# Patient Record
Sex: MALE | Race: ASIAN | NOT HISPANIC OR LATINO | URBAN - METROPOLITAN AREA
[De-identification: names, ages, dates, MRNs, and addresses within clinical notes are randomized per-mention and may not be internally consistent; named-entity substitution may affect disease eponyms.]

---

## 2023-08-07 ENCOUNTER — OFFICE VISIT (OUTPATIENT)
Dept: FAMILY MEDICINE | Facility: CLINIC | Age: 63
End: 2023-08-07
Payer: COMMERCIAL

## 2023-08-07 VITALS
HEART RATE: 78 BPM | HEIGHT: 66 IN | WEIGHT: 172.38 LBS | TEMPERATURE: 97 F | OXYGEN SATURATION: 98 % | DIASTOLIC BLOOD PRESSURE: 80 MMHG | SYSTOLIC BLOOD PRESSURE: 126 MMHG | RESPIRATION RATE: 18 BRPM | BODY MASS INDEX: 27.7 KG/M2

## 2023-08-07 DIAGNOSIS — Z00.00 ANNUAL PHYSICAL EXAM: Primary | ICD-10-CM

## 2023-08-07 DIAGNOSIS — E78.2 MIXED HYPERLIPIDEMIA: ICD-10-CM

## 2023-08-07 PROCEDURE — 99204 PR OFFICE/OUTPT VISIT, NEW, LEVL IV, 45-59 MIN: ICD-10-PCS | Mod: S$GLB,,, | Performed by: INTERNAL MEDICINE

## 2023-08-07 PROCEDURE — 1159F PR MEDICATION LIST DOCUMENTED IN MEDICAL RECORD: ICD-10-PCS | Mod: CPTII,S$GLB,, | Performed by: INTERNAL MEDICINE

## 2023-08-07 PROCEDURE — 3008F PR BODY MASS INDEX (BMI) DOCUMENTED: ICD-10-PCS | Mod: CPTII,S$GLB,, | Performed by: INTERNAL MEDICINE

## 2023-08-07 PROCEDURE — 3074F SYST BP LT 130 MM HG: CPT | Mod: CPTII,S$GLB,, | Performed by: INTERNAL MEDICINE

## 2023-08-07 PROCEDURE — 3079F PR MOST RECENT DIASTOLIC BLOOD PRESSURE 80-89 MM HG: ICD-10-PCS | Mod: CPTII,S$GLB,, | Performed by: INTERNAL MEDICINE

## 2023-08-07 PROCEDURE — 99999 PR PBB SHADOW E&M-NEW PATIENT-LVL IV: ICD-10-PCS | Mod: PBBFAC,,, | Performed by: INTERNAL MEDICINE

## 2023-08-07 PROCEDURE — 3079F DIAST BP 80-89 MM HG: CPT | Mod: CPTII,S$GLB,, | Performed by: INTERNAL MEDICINE

## 2023-08-07 PROCEDURE — 99204 OFFICE O/P NEW MOD 45 MIN: CPT | Mod: S$GLB,,, | Performed by: INTERNAL MEDICINE

## 2023-08-07 PROCEDURE — 99999 PR PBB SHADOW E&M-NEW PATIENT-LVL IV: CPT | Mod: PBBFAC,,, | Performed by: INTERNAL MEDICINE

## 2023-08-07 PROCEDURE — 3074F PR MOST RECENT SYSTOLIC BLOOD PRESSURE < 130 MM HG: ICD-10-PCS | Mod: CPTII,S$GLB,, | Performed by: INTERNAL MEDICINE

## 2023-08-07 PROCEDURE — 3008F BODY MASS INDEX DOCD: CPT | Mod: CPTII,S$GLB,, | Performed by: INTERNAL MEDICINE

## 2023-08-07 PROCEDURE — 1160F PR REVIEW ALL MEDS BY PRESCRIBER/CLIN PHARMACIST DOCUMENTED: ICD-10-PCS | Mod: CPTII,S$GLB,, | Performed by: INTERNAL MEDICINE

## 2023-08-07 PROCEDURE — 1160F RVW MEDS BY RX/DR IN RCRD: CPT | Mod: CPTII,S$GLB,, | Performed by: INTERNAL MEDICINE

## 2023-08-07 PROCEDURE — 1159F MED LIST DOCD IN RCRD: CPT | Mod: CPTII,S$GLB,, | Performed by: INTERNAL MEDICINE

## 2023-08-07 RX ORDER — MULTIVITAMIN
1 TABLET ORAL DAILY
COMMUNITY

## 2023-08-07 RX ORDER — PRAVASTATIN SODIUM 80 MG/1
80 TABLET ORAL DAILY
COMMUNITY
End: 2023-08-07 | Stop reason: SDUPTHER

## 2023-08-07 RX ORDER — PRAVASTATIN SODIUM 80 MG/1
80 TABLET ORAL DAILY
Qty: 90 TABLET | Refills: 3 | Status: SHIPPED | OUTPATIENT
Start: 2023-08-07 | End: 2023-09-15 | Stop reason: SDUPTHER

## 2023-08-07 RX ORDER — FEXOFENADINE HCL AND PSEUDOEPHEDRINE HCI 180; 240 MG/1; MG/1
1 TABLET, EXTENDED RELEASE ORAL DAILY
COMMUNITY

## 2023-08-07 RX ORDER — AMOXICILLIN 500 MG
1 CAPSULE ORAL
COMMUNITY

## 2023-08-07 RX ORDER — ASPIRIN 81 MG/1
81 TABLET ORAL
COMMUNITY

## 2023-08-07 NOTE — PROGRESS NOTES
Subjective     Patient ID: David Epstein is a 63 y.o. male.    Chief Complaint: Establish Care      HPI  Patient presents for physical exam. He has no complaints today. He had a colonoscopy in November of 2021 and gets them every 5 years.     Review of Systems   Constitutional:  Negative for chills and fatigue.   Respiratory:  Negative for chest tightness and shortness of breath.    Cardiovascular:  Negative for chest pain and palpitations.   Gastrointestinal:  Negative for abdominal pain, constipation, diarrhea, nausea and vomiting.   Genitourinary:  Negative for frequency and urgency.   Neurological:  Negative for dizziness, numbness and headaches.          Objective     Physical Exam  Constitutional:       General: He is not in acute distress.     Appearance: Normal appearance. He is normal weight.   HENT:      Head: Normocephalic and atraumatic.   Cardiovascular:      Rate and Rhythm: Normal rate and regular rhythm.      Heart sounds: Normal heart sounds. No murmur heard.     No friction rub. No gallop.   Pulmonary:      Effort: Pulmonary effort is normal.      Breath sounds: Normal breath sounds. No wheezing, rhonchi or rales.   Abdominal:      General: Bowel sounds are normal. There is no distension.      Palpations: Abdomen is soft.      Tenderness: There is no abdominal tenderness. There is no rebound.   Skin:     General: Skin is warm and dry.      Coloration: Skin is not jaundiced.   Neurological:      General: No focal deficit present.      Mental Status: He is alert and oriented to person, place, and time. Mental status is at baseline.   Psychiatric:         Mood and Affect: Mood normal.         Behavior: Behavior normal.            Assessment and Plan     1. Annual physical exam  Comments:  Will order Lipid panel, CBC and CMP for monitoring.  Orders:  -     CBC Auto Differential; Future; Expected date: 08/07/2023  -     Comprehensive Metabolic Panel; Future; Expected date: 08/07/2023    2. Mixed  hyperlipidemia  Comments:  Will get lipid panel for medication monitoring. Will refill Pravastatin today  Orders:  -     Lipid Panel; Future; Expected date: 08/07/2023  -     pravastatin (PRAVACHOL) 80 MG tablet; Take 1 tablet (80 mg total) by mouth once daily.  Dispense: 90 tablet; Refill: 3               Follow up in about 6 months (around 2/7/2024).

## 2023-08-10 ENCOUNTER — LAB VISIT (OUTPATIENT)
Dept: LAB | Facility: HOSPITAL | Age: 63
End: 2023-08-10
Attending: INTERNAL MEDICINE
Payer: COMMERCIAL

## 2023-08-10 DIAGNOSIS — E78.2 MIXED HYPERLIPIDEMIA: ICD-10-CM

## 2023-08-10 DIAGNOSIS — Z00.00 ANNUAL PHYSICAL EXAM: ICD-10-CM

## 2023-08-10 LAB
ALBUMIN SERPL BCP-MCNC: 3.7 G/DL (ref 3.5–5.2)
ALP SERPL-CCNC: 58 U/L (ref 55–135)
ALT SERPL W/O P-5'-P-CCNC: 24 U/L (ref 10–44)
ANION GAP SERPL CALC-SCNC: 8 MMOL/L (ref 8–16)
AST SERPL-CCNC: 24 U/L (ref 10–40)
BASOPHILS # BLD AUTO: 0.03 K/UL (ref 0–0.2)
BASOPHILS NFR BLD: 0.6 % (ref 0–1.9)
BILIRUB SERPL-MCNC: 0.9 MG/DL (ref 0.1–1)
BUN SERPL-MCNC: 14 MG/DL (ref 8–23)
CALCIUM SERPL-MCNC: 9.1 MG/DL (ref 8.7–10.5)
CHLORIDE SERPL-SCNC: 108 MMOL/L (ref 95–110)
CHOLEST SERPL-MCNC: 163 MG/DL (ref 120–199)
CHOLEST/HDLC SERPL: 3.7 {RATIO} (ref 2–5)
CO2 SERPL-SCNC: 26 MMOL/L (ref 23–29)
CREAT SERPL-MCNC: 0.8 MG/DL (ref 0.5–1.4)
DIFFERENTIAL METHOD: ABNORMAL
EOSINOPHIL # BLD AUTO: 0.1 K/UL (ref 0–0.5)
EOSINOPHIL NFR BLD: 2.9 % (ref 0–8)
ERYTHROCYTE [DISTWIDTH] IN BLOOD BY AUTOMATED COUNT: 13.2 % (ref 11.5–14.5)
EST. GFR  (NO RACE VARIABLE): >60 ML/MIN/1.73 M^2
GLUCOSE SERPL-MCNC: 86 MG/DL (ref 70–110)
HCT VFR BLD AUTO: 48.3 % (ref 40–54)
HDLC SERPL-MCNC: 44 MG/DL (ref 40–75)
HDLC SERPL: 27 % (ref 20–50)
HGB BLD-MCNC: 15.3 G/DL (ref 14–18)
IMM GRANULOCYTES # BLD AUTO: 0.01 K/UL (ref 0–0.04)
IMM GRANULOCYTES NFR BLD AUTO: 0.2 % (ref 0–0.5)
LDLC SERPL CALC-MCNC: 88.2 MG/DL (ref 63–159)
LYMPHOCYTES # BLD AUTO: 1.9 K/UL (ref 1–4.8)
LYMPHOCYTES NFR BLD: 39.6 % (ref 18–48)
MCH RBC QN AUTO: 28.8 PG (ref 27–31)
MCHC RBC AUTO-ENTMCNC: 31.7 G/DL (ref 32–36)
MCV RBC AUTO: 91 FL (ref 82–98)
MONOCYTES # BLD AUTO: 0.5 K/UL (ref 0.3–1)
MONOCYTES NFR BLD: 9.3 % (ref 4–15)
NEUTROPHILS # BLD AUTO: 2.3 K/UL (ref 1.8–7.7)
NEUTROPHILS NFR BLD: 47.4 % (ref 38–73)
NONHDLC SERPL-MCNC: 119 MG/DL
NRBC BLD-RTO: 0 /100 WBC
PLATELET # BLD AUTO: 206 K/UL (ref 150–450)
PMV BLD AUTO: 10.3 FL (ref 9.2–12.9)
POTASSIUM SERPL-SCNC: 4.1 MMOL/L (ref 3.5–5.1)
PROT SERPL-MCNC: 7 G/DL (ref 6–8.4)
RBC # BLD AUTO: 5.31 M/UL (ref 4.6–6.2)
SODIUM SERPL-SCNC: 142 MMOL/L (ref 136–145)
TRIGL SERPL-MCNC: 154 MG/DL (ref 30–150)
WBC # BLD AUTO: 4.85 K/UL (ref 3.9–12.7)

## 2023-08-10 PROCEDURE — 85025 COMPLETE CBC W/AUTO DIFF WBC: CPT | Performed by: INTERNAL MEDICINE

## 2023-08-10 PROCEDURE — 36415 COLL VENOUS BLD VENIPUNCTURE: CPT | Mod: PO | Performed by: INTERNAL MEDICINE

## 2023-08-10 PROCEDURE — 80061 LIPID PANEL: CPT | Performed by: INTERNAL MEDICINE

## 2023-08-10 PROCEDURE — 80053 COMPREHEN METABOLIC PANEL: CPT | Performed by: INTERNAL MEDICINE

## 2023-09-15 DIAGNOSIS — E78.2 MIXED HYPERLIPIDEMIA: ICD-10-CM

## 2023-09-15 RX ORDER — PRAVASTATIN SODIUM 80 MG/1
80 TABLET ORAL DAILY
Qty: 90 TABLET | Refills: 3 | Status: SHIPPED | OUTPATIENT
Start: 2023-09-15 | End: 2024-09-14

## 2023-09-15 NOTE — TELEPHONE ENCOUNTER
----- Message from Yanna volodymyr sent at 9/15/2023  9:41 AM CDT -----  Name of Who is Calling:Patient           What is the request in detail:Patient requesting refill at a new pharmacy.    pravastatin (PRAVACHOL) 80 MG tablet 90 tablet 3 8/7/2023 8/6/2024 No  Sig - Route: Take 1 tablet (80 mg total) by mouth once daily. - Oral  Sent to pharmacy as: pravastatin (PRAVACHOL) 80 MG tablet  Class: Normal  Order: [id number]  Date/Time Signed: 8/7/2023 14:48      E-Prescribing Status: Receipt confirmed by pharmacy (8/7/2023  2:48 PM CDT)        [facility] [address]               Can the clinic reply by [facility]:no           What Number to Call Back if not in [facility]: [phone]

## 2024-02-06 DIAGNOSIS — Z12.11 COLON CANCER SCREENING: ICD-10-CM

## 2025-02-24 ENCOUNTER — HOSPITAL ENCOUNTER (EMERGENCY)
Facility: HOSPITAL | Age: 65
Discharge: ANOTHER HOSPITAL | End: 2025-02-24
Attending: EMERGENCY MEDICINE
Payer: COMMERCIAL

## 2025-02-24 VITALS
OXYGEN SATURATION: 99 % | WEIGHT: 180 LBS | TEMPERATURE: 98 F | HEIGHT: 68 IN | HEART RATE: 76 BPM | SYSTOLIC BLOOD PRESSURE: 137 MMHG | DIASTOLIC BLOOD PRESSURE: 81 MMHG | BODY MASS INDEX: 27.28 KG/M2 | RESPIRATION RATE: 18 BRPM

## 2025-02-24 DIAGNOSIS — L02.91 PHLEGMON: Primary | ICD-10-CM

## 2025-02-24 LAB
ALBUMIN SERPL BCP-MCNC: 3.6 G/DL (ref 3.5–5.2)
ALP SERPL-CCNC: 66 U/L (ref 40–150)
ALT SERPL W/O P-5'-P-CCNC: 30 U/L (ref 10–44)
ANION GAP SERPL CALC-SCNC: 8 MMOL/L (ref 8–16)
AST SERPL-CCNC: 27 U/L (ref 10–40)
BASOPHILS # BLD AUTO: 0.05 K/UL (ref 0–0.2)
BASOPHILS NFR BLD: 0.8 % (ref 0–1.9)
BILIRUB SERPL-MCNC: 0.4 MG/DL (ref 0.1–1)
BUN SERPL-MCNC: 18 MG/DL (ref 8–23)
CALCIUM SERPL-MCNC: 9.1 MG/DL (ref 8.7–10.5)
CHLORIDE SERPL-SCNC: 108 MMOL/L (ref 95–110)
CO2 SERPL-SCNC: 22 MMOL/L (ref 23–29)
CREAT SERPL-MCNC: 0.8 MG/DL (ref 0.5–1.4)
DIFFERENTIAL METHOD BLD: NORMAL
EOSINOPHIL # BLD AUTO: 0.4 K/UL (ref 0–0.5)
EOSINOPHIL NFR BLD: 6.8 % (ref 0–8)
ERYTHROCYTE [DISTWIDTH] IN BLOOD BY AUTOMATED COUNT: 12.5 % (ref 11.5–14.5)
EST. GFR  (NO RACE VARIABLE): >60 ML/MIN/1.73 M^2
GLUCOSE SERPL-MCNC: 106 MG/DL (ref 70–110)
HCT VFR BLD AUTO: 45.1 % (ref 40–54)
HCV AB SERPL QL IA: NEGATIVE
HEP C VIRUS HOLD SPECIMEN: NORMAL
HGB BLD-MCNC: 14.6 G/DL (ref 14–18)
HIV 1+2 AB+HIV1 P24 AG SERPL QL IA: NEGATIVE
IMM GRANULOCYTES # BLD AUTO: 0.01 K/UL (ref 0–0.04)
IMM GRANULOCYTES NFR BLD AUTO: 0.2 % (ref 0–0.5)
LACTATE SERPL-SCNC: 1.3 MMOL/L (ref 0.5–2.2)
LYMPHOCYTES # BLD AUTO: 2 K/UL (ref 1–4.8)
LYMPHOCYTES NFR BLD: 30.6 % (ref 18–48)
MCH RBC QN AUTO: 29.4 PG (ref 27–31)
MCHC RBC AUTO-ENTMCNC: 32.4 G/DL (ref 32–36)
MCV RBC AUTO: 91 FL (ref 82–98)
MONOCYTES # BLD AUTO: 0.6 K/UL (ref 0.3–1)
MONOCYTES NFR BLD: 8.9 % (ref 4–15)
NEUTROPHILS # BLD AUTO: 3.4 K/UL (ref 1.8–7.7)
NEUTROPHILS NFR BLD: 52.7 % (ref 38–73)
NRBC BLD-RTO: 0 /100 WBC
PLATELET # BLD AUTO: 202 K/UL (ref 150–450)
PMV BLD AUTO: 9.7 FL (ref 9.2–12.9)
POTASSIUM SERPL-SCNC: 4.2 MMOL/L (ref 3.5–5.1)
PROCALCITONIN SERPL IA-MCNC: 0.02 NG/ML
PROT SERPL-MCNC: 7.2 G/DL (ref 6–8.4)
RBC # BLD AUTO: 4.96 M/UL (ref 4.6–6.2)
SODIUM SERPL-SCNC: 138 MMOL/L (ref 136–145)
WBC # BLD AUTO: 6.5 K/UL (ref 3.9–12.7)

## 2025-02-24 PROCEDURE — 87389 HIV-1 AG W/HIV-1&-2 AB AG IA: CPT | Performed by: EMERGENCY MEDICINE

## 2025-02-24 PROCEDURE — 80053 COMPREHEN METABOLIC PANEL: CPT | Performed by: REGISTERED NURSE

## 2025-02-24 PROCEDURE — 25500020 PHARM REV CODE 255: Performed by: EMERGENCY MEDICINE

## 2025-02-24 PROCEDURE — 85025 COMPLETE CBC W/AUTO DIFF WBC: CPT | Performed by: REGISTERED NURSE

## 2025-02-24 PROCEDURE — 96365 THER/PROPH/DIAG IV INF INIT: CPT

## 2025-02-24 PROCEDURE — 86803 HEPATITIS C AB TEST: CPT | Performed by: EMERGENCY MEDICINE

## 2025-02-24 PROCEDURE — 63600175 PHARM REV CODE 636 W HCPCS: Performed by: REGISTERED NURSE

## 2025-02-24 PROCEDURE — 87040 BLOOD CULTURE FOR BACTERIA: CPT | Performed by: REGISTERED NURSE

## 2025-02-24 PROCEDURE — 99285 EMERGENCY DEPT VISIT HI MDM: CPT | Mod: 25

## 2025-02-24 PROCEDURE — 83605 ASSAY OF LACTIC ACID: CPT | Performed by: REGISTERED NURSE

## 2025-02-24 PROCEDURE — 84145 PROCALCITONIN (PCT): CPT | Performed by: REGISTERED NURSE

## 2025-02-24 RX ORDER — CLINDAMYCIN PHOSPHATE 900 MG/50ML
900 INJECTION, SOLUTION INTRAVENOUS
Status: COMPLETED | OUTPATIENT
Start: 2025-02-24 | End: 2025-02-24

## 2025-02-24 RX ORDER — ROSUVASTATIN CALCIUM 40 MG/1
40 TABLET, COATED ORAL NIGHTLY
COMMUNITY

## 2025-02-24 RX ADMIN — IOHEXOL 100 ML: 350 INJECTION, SOLUTION INTRAVENOUS at 04:02

## 2025-02-24 RX ADMIN — CLINDAMYCIN PHOSPHATE 900 MG: 900 INJECTION, SOLUTION INTRAVENOUS at 03:02

## 2025-02-24 NOTE — FIRST PROVIDER EVALUATION
"Medical screening examination initiated.  I have conducted a focused provider triage encounter, findings are as follows:    Brief history of present illness:  Eli-oribitial cellulitis, took 1 week of abx and worsening. Sent by eye doctor for further eval.    Vitals:    02/24/25 1337   BP: (!) 138/90   BP Location: Right arm   Pulse: 77   Resp: 16   Temp: 98.1 °F (36.7 °C)   TempSrc: Oral   SpO2: 98%   Weight: 81.6 kg (180 lb)   Height: 5' 8" (1.727 m)       Pertinent physical exam:  No acute distress, VS stable    Brief workup plan:  Workup    Preliminary workup initiated; this workup will be continued and followed by the physician or advanced practice provider that is assigned to the patient when roomed.  "

## 2025-02-24 NOTE — ED PROVIDER NOTES
SCRIBE #1 NOTE: I, Justin Watson, am scribing for, and in the presence of, Jeanne Munoz DO. I have scribed the HPI, ROS, and PE.    SCRIBE #2 NOTE: I, Rossi Marie, am scribing for, and in the presence of,  Mat Singh MD. I have scribed the remaining portions of the note not scribed by Scribe #1.      History     Chief Complaint   Patient presents with    Eye Problem     Pt states he has an eye infection and was given antibiotics. Pt states he took all the antibiotics and his eye is not any better. Pt was advised by his PCP to come to the ED      Review of patient's allergies indicates:   Allergen Reactions    Grass pollen-june grass standard          History of Present Illness     HPI    2/24/2025, 3:11 PM  History obtained from the patient      History of Present Illness: David Epstein is a 64 y.o. male patient who presents to the Emergency Department for evaluation of left eye pain which onset gradually within the past two weeks. Pt saw his eye doctor last week who had originally prescribed 7 days of amoxicillin oral and antibiotic eye ointment medication, which provided no relief or improvement. Pt saw eye doctor today and was sent to the ED for further evaluation. Pt has facial swelling over the left orbit. Symptoms are constant and moderate in severity. No mitigating or exacerbating factors reported. No associated sxs. Patient denies any vision changes and all other sxs at this time. No further complaints or concerns at this time.       Arrival mode: Personal vehicle    PCP: Rebeca Christiansen DO        Past Medical History:  History reviewed. No pertinent past medical history.    Past Surgical History:  History reviewed. No pertinent surgical history.      Family History:  No family history on file.    Social History:  Social History     Tobacco Use    Smoking status: Never    Smokeless tobacco: Never   Substance and Sexual Activity    Alcohol use: Not on file    Drug use: Not on file    Sexual  "activity: Not on file        Review of Systems     Review of Systems   Constitutional:  Negative for fever.   HENT:  Positive for facial swelling (to left oribital).    Eyes:  Positive for pain (left eye) and redness. Negative for photophobia and visual disturbance.   Neurological:  Negative for headaches.   Psychiatric/Behavioral:  Negative for confusion.         Physical Exam     Initial Vitals [02/24/25 1337]   BP Pulse Resp Temp SpO2   (!) 138/90 77 16 98.1 °F (36.7 °C) 98 %      MAP       --          Physical Exam  Nursing Notes and Vital Signs Reviewed.  Constitutional: Patient is in no acute distress. Well-developed and well-nourished.  Head: Atraumatic. Normocephalic.  Eyes: PERRL. EOM intact; pain with EOM movement. Scleral injection on the left. There is periorbital edema over the left eye. Proptosis.  ENT: Mucous membranes are moist. Oropharynx is clear and symmetric.    Neck: Supple. Full ROM. No lymphadenopathy.  Cardiovascular: Regular rate. Regular rhythm. No murmurs, rubs, or gallops.   Pulmonary/Chest: No respiratory distress. Clear to auscultation bilaterally. No wheezing or rales.  Abdominal: Soft and non-distended.  There is no tenderness.  No rebound, guarding, or rigidity.  Musculoskeletal: Moves all extremities. No obvious deformities.   Skin: Warm and dry.  Neurological:  Alert, awake, and appropriate.  Normal speech.  No acute focal neurological deficits are appreciated.  Psychiatric: Normal affect. Appropriate in content.     ED Course   Procedures  ED Vital Signs:  Vitals:    02/24/25 1337 02/24/25 1530 02/24/25 1715 02/24/25 1905   BP: (!) 138/90 117/74 129/78 128/88   Pulse: 77 69 74 77   Resp: 16 18 16 18   Temp: 98.1 °F (36.7 °C)      TempSrc: Oral      SpO2: 98% 99% 99% 99%   Weight: 81.6 kg (180 lb)      Height: 5' 8" (1.727 m)       02/24/25 2020 02/24/25 2100 02/24/25 2250   BP: (!) 146/80 (!) 140/77 137/81   Pulse: 79 74 76   Resp: 18 18 18   Temp: 97.9 °F (36.6 °C)     TempSrc: " Oral     SpO2: 98% 99% 99%   Weight:      Height:          Abnormal Lab Results:  Labs Reviewed   COMPREHENSIVE METABOLIC PANEL - Abnormal       Result Value    Sodium 138      Potassium 4.2      Chloride 108      CO2 22 (*)     Glucose 106      BUN 18      Creatinine 0.8      Calcium 9.1      Total Protein 7.2      Albumin 3.6      Total Bilirubin 0.4      Alkaline Phosphatase 66      AST 27      ALT 30      eGFR >60      Anion Gap 8     CULTURE, BLOOD    Blood Culture, Routine No Growth to date     CULTURE, BLOOD    Blood Culture, Routine No Growth to date     CBC W/ AUTO DIFFERENTIAL    WBC 6.50      RBC 4.96      Hemoglobin 14.6      Hematocrit 45.1      MCV 91      MCH 29.4      MCHC 32.4      RDW 12.5      Platelets 202      MPV 9.7      Immature Granulocytes 0.2      Gran # (ANC) 3.4      Immature Grans (Abs) 0.01      Lymph # 2.0      Mono # 0.6      Eos # 0.4      Baso # 0.05      nRBC 0      Gran % 52.7      Lymph % 30.6      Mono % 8.9      Eosinophil % 6.8      Basophil % 0.8      Differential Method Automated     LACTIC ACID, PLASMA    Lactate (Lactic Acid) 1.3     PROCALCITONIN    Procalcitonin 0.02     HEPATITIS C ANTIBODY    Hepatitis C Ab Negative      Narrative:     Release to patient->Immediate   HEP C VIRUS HOLD SPECIMEN    HEP C Virus Hold Specimen Hold for HCV sendout      Narrative:     Release to patient->Immediate   HIV 1 / 2 ANTIBODY    HIV 1/2 Ag/Ab Negative      Narrative:     Release to patient->Immediate        All Lab Results:  Results for orders placed or performed during the hospital encounter of 02/24/25   Blood Culture #2 **CANNOT BE ORDERED STAT**    Collection Time: 02/24/25  2:26 PM    Specimen: Peripheral, Forearm, Left; Blood   Result Value Ref Range    Blood Culture, Routine No Growth to date    Blood Culture #1 **CANNOT BE ORDERED STAT**    Collection Time: 02/24/25  2:26 PM    Specimen: Peripheral, Forearm, Left; Blood   Result Value Ref Range    Blood Culture, Routine No  Growth to date    CBC auto differential    Collection Time: 02/24/25  2:28 PM   Result Value Ref Range    WBC 6.50 3.90 - 12.70 K/uL    RBC 4.96 4.60 - 6.20 M/uL    Hemoglobin 14.6 14.0 - 18.0 g/dL    Hematocrit 45.1 40.0 - 54.0 %    MCV 91 82 - 98 fL    MCH 29.4 27.0 - 31.0 pg    MCHC 32.4 32.0 - 36.0 g/dL    RDW 12.5 11.5 - 14.5 %    Platelets 202 150 - 450 K/uL    MPV 9.7 9.2 - 12.9 fL    Immature Granulocytes 0.2 0.0 - 0.5 %    Gran # (ANC) 3.4 1.8 - 7.7 K/uL    Immature Grans (Abs) 0.01 0.00 - 0.04 K/uL    Lymph # 2.0 1.0 - 4.8 K/uL    Mono # 0.6 0.3 - 1.0 K/uL    Eos # 0.4 0.0 - 0.5 K/uL    Baso # 0.05 0.00 - 0.20 K/uL    nRBC 0 0 /100 WBC    Gran % 52.7 38.0 - 73.0 %    Lymph % 30.6 18.0 - 48.0 %    Mono % 8.9 4.0 - 15.0 %    Eosinophil % 6.8 0.0 - 8.0 %    Basophil % 0.8 0.0 - 1.9 %    Differential Method Automated    Comprehensive metabolic panel    Collection Time: 02/24/25  2:28 PM   Result Value Ref Range    Sodium 138 136 - 145 mmol/L    Potassium 4.2 3.5 - 5.1 mmol/L    Chloride 108 95 - 110 mmol/L    CO2 22 (L) 23 - 29 mmol/L    Glucose 106 70 - 110 mg/dL    BUN 18 8 - 23 mg/dL    Creatinine 0.8 0.5 - 1.4 mg/dL    Calcium 9.1 8.7 - 10.5 mg/dL    Total Protein 7.2 6.0 - 8.4 g/dL    Albumin 3.6 3.5 - 5.2 g/dL    Total Bilirubin 0.4 0.1 - 1.0 mg/dL    Alkaline Phosphatase 66 40 - 150 U/L    AST 27 10 - 40 U/L    ALT 30 10 - 44 U/L    eGFR >60 >60 mL/min/1.73 m^2    Anion Gap 8 8 - 16 mmol/L   Lactic acid, plasma    Collection Time: 02/24/25  2:28 PM   Result Value Ref Range    Lactate (Lactic Acid) 1.3 0.5 - 2.2 mmol/L   Procalcitonin    Collection Time: 02/24/25  2:28 PM   Result Value Ref Range    Procalcitonin 0.02 <0.25 ng/mL   Hepatitis C Antibody    Collection Time: 02/24/25  2:28 PM   Result Value Ref Range    Hepatitis C Ab Negative Negative   HCV Virus Hold Specimen    Collection Time: 02/24/25  2:28 PM   Result Value Ref Range    HEP C Virus Hold Specimen Hold for HCV sendout    HIV 1/2 Ag/Ab  (4th Gen)    Collection Time: 02/24/25  2:28 PM   Result Value Ref Range    HIV 1/2 Ag/Ab Negative Negative         Imaging Results:  Imaging Results              CT Head Without Contrast (Final result)  Result time 02/24/25 16:43:25      Final result by Devin Gray ([facility]), MD (02/24/25 16:43:25)                   Impression:     No acute intracranial abnormalities.    All CT scans at [this location] are performed using dose modulation techniques as appropriate to a performed exam including the following:  Automated exposure control; adjustment of the mA and/or kV according to patient size (this includes techniques or standardized protocols for targeted exams where dose is matched to indication / reason for exam; i.e. extremities or head); use of iterative reconstruction technique.    Finalized on: 2/24/2025 4:43 PM By:  Gideon Gray MD  [facility]# [id number]      2025-02-24 16:45:31.294     [facility]               Narrative:    EXAM:  CT HEAD WITHOUT CONTRAST    CLINICAL HISTORY: Headache, head injury    TECHNIQUE: Axial images were obtained without intravenous contrast.    FINDINGS: There is no evidence of mass, hemorrhage, or midline shift.  No definite acute intracranial abnormalities.    Skull is intact.  Scattered mucosal thickening of the paranasal sinuses bilaterally.                                             CT Maxillofacial With Contrast (Final result)  Result time 02/24/25 16:53:34      Final result by Keyana Pacheco MD (02/24/25 16:53:34)                   Impression:       Globes are symmetric appear grossly intact.  Enhancing indistinct phlegmon suspected at the anterior medial left orbit which appears predominantly extraconal with surrounding soft tissue stranding as well as regional myositis. No aggressive bony finding    All CT scans at [this location] are performed using dose modulation techniques as appropriate to a performed exam including the following: automated exposure control; adjustment of  the mA and/or kV according to patient size (this includes techniques or standardized protocols for targeted exams where dose is matched to indication / reason for exam; i.e. extremities or head); use of iterative reconstruction technique.    Finalized on: 2/24/2025 4:53 PM By:  Keyana Pacheco MD  [facility]# [id number]      2025-02-24 16:55:42.366     [facility]               Narrative:    EXAM: CT MAXILLOFACIAL WITH CONTRAST    CLINICAL INDICATION:  Orbital cellulitis    TECHNIQUE: Axial and multiplanar 2-D reformations provided  with injury is contrast enhancement                                           The Emergency Provider reviewed the vital signs and test results, which are outlined above.     ED Discussion     5:02 PM:  consulted.    5:10 PM: Dr. Sanford () recommends consulting opthalmology prior to deciding disposition.     5:21PM: Spoke with Natacha Beaulieu MD (Ophthalmology) states she is unable to make determination whether or not the pt will need phlegmon in the periorbital area drained. Recommends consulting OMFS.    8:00 PM: Dr. Munoz transfers care of patient to Dr. Singh pending lab and imaging results.     8:56 PM: Consult with Dr. Canchola (Oral and Maxillofacial Surgery) at [facility] concerning pt. There are no Maxillofacial Surgery services, which the patient requires, offered at [facility] at this time. Dr. Canchola expresses understanding and will accept transfer.  Accepting Facility: [facility]  Accepting Physician: Dr. Canchola     9:00 PM: Pt refuses to go by ambulance to transfer destination.    ED Course as of 02/25/25 0522   Mon Feb 24, 2025   1700 64-year-old male presents with periorbital cellulitis and failure of outpatient treatment.  Concern for orbital cellulitis therefore CT obtained and shows: globes are symmetric appear grossly intact, enhancing indistinct phlegmon suspected at the anterior medial left orbit which appears predominantly extraconal with surrounding  soft tissue stranding as well as regional myositis, no aggressive bony finding.  No fever or leukocytosis.  Procalcitonin lactic normal.  Renal function unremarkable.  Blood cultures obtained and treated with IV clinda.  Consider admission for failure of outpatient treatment. [NF]   1854 Sent here by Jose Munguia (optometry)  [phone] [NF]   1945 OLOL and BRG at [facility] will call back  [NF]      ED Course User Index  [NF] Jeanne Munoz DO     Medical Decision Making  Amount and/or Complexity of Data Reviewed  Labs: ordered. Decision-making details documented in ED Course.  Radiology: ordered. Decision-making details documented in ED Course.    Risk  Prescription drug management.                ED Medication(s):  Medications   clindamycin in D5W 900 mg/50 mL IVPB 900 mg (0 mg Intravenous Stopped 2/24/25 1545)   iohexoL (OMNIPAQUE 350) injection 100 mL (100 mLs Intravenous Given 2/24/25 1611)       Discharge Medication List as of 2/24/2025 10:59 PM                  Scribe Attestation:   Scribe #1: I performed the above scribed service and the documentation accurately describes the services I performed. I attest to the accuracy of the note.     Attending:   Physician Attestation Statement for Scribe #1: I, Jeanne Munoz DO, personally performed the services described in this documentation, as scribed by Justin Watson, in my presence, and it is both accurate and complete.       Scribe Attestation:   Scribe #2: I performed the above scribed service and the documentation accurately describes the services I performed. I attest to the accuracy of the note.    Attending Attestation:           Physician Attestation for Scribe:    Physician Attestation Statement for Scribe #2: I, Mat Singh MD, reviewed documentation, as scribed by Rossi Marie in my presence, and it is both accurate and complete. I also acknowledge and confirm the content of the note done by Riyaibe #1.           Clinical Impression        ICD-10-CM ICD-9-CM   1. Phlegmon  L02.91 682.9       Disposition:   Disposition: Transferred  Condition: Stable        Mat Singh MD  02/25/25 0559

## 2025-03-01 LAB
BACTERIA BLD CULT: NORMAL
BACTERIA BLD CULT: NORMAL